# Patient Record
(demographics unavailable — no encounter records)

---

## 2024-11-07 NOTE — HISTORY OF PRESENT ILLNESS
[FreeTextEntry1] : Evaluation of growths [de-identified] : First visit for 37-year-old white male presents for evaluation of growths. Particularly concerned about lesions on the back.  Patient's paternal grandfather with history of malignant melanoma.

## 2024-11-07 NOTE — PHYSICAL EXAM
[Alert] : alert [Oriented x 3] : ~L oriented x 3 [Well Nourished] : well nourished [FreeTextEntry3] : The following areas were examined and no significant abnormalities were seen except as noted below:  Type II skin  scalp, face, eyelids, nose, lips, ears, neck, chest, abdomen, back,groin, buttocks, right arm, left arm, right hand, left hand, right  leg, left leg, right foot, left foot  Back: Moderate multiple brown macules and patches-mildly dysplastic Upper mid back on left: 10 x 10 mm brown patch with a faint tail [place] off 4:30 Darker [place] Lower mid back on left: 7 x 6 mm slightly elevated red-brown plaque with a faint tan tail orf 2:00  No other suspicious lesions seen